# Patient Record
Sex: FEMALE | Race: WHITE | URBAN - METROPOLITAN AREA
[De-identification: names, ages, dates, MRNs, and addresses within clinical notes are randomized per-mention and may not be internally consistent; named-entity substitution may affect disease eponyms.]

---

## 2021-06-22 NOTE — PROGRESS NOTES
Chief Complaint   Patient presents with   [facility].     would like to have labs drawn    Bite     unsure what they are coming from    GI Problem    Weight Management     would like to gain    Rash     showed up 1 week ago, no itchy not spreading.  Lymphadenopathy    Mood Swings     could be anxity         HPI:  Sofía Vallejo is a 23 y.o. (: 2000) here today to establish care and discuss GI issues for the last year in the setting of anxiety. Symptoms include:  - generalized abdominal pain that lasts for minutes to hours. - nausea  - non bloody diarrhea, about 4 times a day  - failure to thrive even though she eats 3 meals and snacks. - sometimes pain happens in the middle of the night. - she cannot find an association with food regarding her symptoms. - no assoc with pre or post prandial, no association with dairy or gluten. - has not tried a full on elimination diet however. - politely requesting testing for h.pylori    Rash on stomach has popped up over the last 3 days. Flesh colored and slightly raised plaques. She is also getting bug bites on legs. Regarding her history of anxiety, she has been treated with zoloft before but stopped it because it made her feel worse. She would love to try [facility] and talk to some one. Her anxiety is worsening over the past couple years but no trigger. Just an underlying anxious feeling that affects her day to day and sleep. Review of Systems   Constitutional: Negative for activity change, appetite change, chills, fatigue, fever and unexpected weight change. HENT: Negative for congestion, postnasal drip, rhinorrhea, sinus pressure, sinus pain, sneezing and sore throat. Eyes: Negative for visual disturbance. Respiratory: Negative for cough, chest tightness, shortness of breath and wheezing. Cardiovascular: Negative for chest pain and palpitations. Gastrointestinal: Positive for abdominal pain, diarrhea and nausea.  Negative for blood in stool, constipation and vomiting. Endocrine: Negative for cold intolerance, heat intolerance, polydipsia and polyuria. Genitourinary: Negative for dysuria, frequency, vaginal bleeding and vaginal discharge. Musculoskeletal: Negative for arthralgias, back pain, joint swelling, myalgias and neck pain. Skin: Positive for rash. Negative for wound. Allergic/Immunologic: Negative for environmental allergies. Neurological: Positive for headaches. Negative for dizziness, tremors, syncope, weakness, light-headedness and numbness. Hematological: Negative for adenopathy. Psychiatric/Behavioral: Positive for behavioral problems and sleep disturbance. Negative for decreased concentration and suicidal ideas. The patient is nervous/anxious. Past Medical History:   Diagnosis Date    Acne     Face       Family History   Problem Relation Age of Onset    Anemia Mother     Heart Defect Father        Social History     Tobacco Use    Smoking status: Former Smoker     Types: Cigarettes    Smokeless tobacco: Never Used   Vaping Use    Vaping Use: Every day    Substances: Nicotine   Substance Use Topics    Alcohol use: Yes     Comment: occ    Drug use: No       New Prescriptions    ESCITALOPRAM (LEXAPRO) 10 MG TABLET    Take 1 tablet by mouth daily       Meds Prior to visit:  No current outpatient medications on file prior to visit. No current facility-administered medications on file prior to visit. Allergies   Allergen Reactions    Doxycycline      nausea    Macrolides And Ketolides Nausea Only    Troleandomycin Nausea Only    Zithromax [Azithromycin]        OBJECTIVE:  /69   Pulse 86   Temp 98.1 °F (36.7 °C) (Temporal)   Resp 16   Ht 5' 4.5\" (1.638 m)   Wt 92 lb (41.7 kg)   LMP 06/20/2021   BMI 15.55 kg/m²   BP Readings from Last 2 Encounters:   06/23/21 104/69     Wt Readings from Last 3 Encounters:   06/23/21 92 lb (41.7 kg)       Physical Exam  Vitals reviewed. Constitutional:       General: She is not in acute distress. Appearance: Normal appearance. She is well-developed. She is not ill-appearing. HENT:      Head: Normocephalic and atraumatic. Right Ear: Tympanic membrane, ear canal and external ear normal. There is no impacted cerumen. Left Ear: Tympanic membrane, ear canal and external ear normal. There is no impacted cerumen. Nose: Nose normal.      Mouth/Throat:      Mouth: Mucous membranes are moist.      Pharynx: Oropharynx is clear. No oropharyngeal exudate or posterior oropharyngeal erythema. Eyes:      General: No scleral icterus. Right eye: No discharge. Left eye: No discharge. Extraocular Movements: Extraocular movements intact. Conjunctiva/sclera: Conjunctivae normal.      Pupils: Pupils are equal, round, and reactive to light. Neck:      Vascular: No carotid bruit. Cardiovascular:      Rate and Rhythm: Normal rate and regular rhythm. Pulses: Normal pulses. Heart sounds: Normal heart sounds. No murmur heard. Comments: Radial and pedal pulses intact  Pulmonary:      Effort: Pulmonary effort is normal. No respiratory distress. Breath sounds: Normal breath sounds. No wheezing or rales. Chest:      Chest wall: No tenderness. Abdominal:      General: Abdomen is flat. Bowel sounds are normal. There is no distension. Palpations: Abdomen is soft. There is no mass. Tenderness: There is no abdominal tenderness. There is no right CVA tenderness, left CVA tenderness, guarding or rebound. Hernia: No hernia is present. Comments: Normal liver and spleen. No organomegaly   Musculoskeletal:         General: No swelling, tenderness, deformity or signs of injury. Normal range of motion. Cervical back: Normal range of motion and neck supple. No tenderness. Right lower leg: No edema. Left lower leg: No edema.       Comments: Intact in all extremities Lymphadenopathy:      Cervical: No cervical adenopathy. Skin:     General: Skin is warm. Capillary Refill: Capillary refill takes less than 2 seconds. Findings: Rash present. No bruising, erythema or lesion. Comments: Stomach: skin coloered, sort of hyperpigmented areas with raised edges. May be plaques but are small. No secondary signs. Neurological:      General: No focal deficit present. Mental Status: She is alert and oriented to person, place, and time. Mental status is at baseline. Cranial Nerves: No cranial nerve deficit. Sensory: No sensory deficit. Motor: No weakness or abnormal muscle tone. Coordination: Coordination normal.      Gait: Gait normal.      Deep Tendon Reflexes: Reflexes normal.   Psychiatric:         Mood and Affect: Mood normal.         Behavior: Behavior normal.         Thought Content: Thought content normal.         Judgment: Judgment normal.         Lab Results   Component Value Date    WBC 13.3 (H) 07/30/2015    HGB 13.8 07/30/2015    HCT 40.5 07/30/2015    MCV 92.2 07/30/2015     07/30/2015     Lab Results   Component Value Date     07/30/2015    K 3.5 07/30/2015    CL 99 07/30/2015    CO2 26 (H) 07/30/2015    BUN 7 07/30/2015    CREATININE <0.5 07/30/2015    GLUCOSE 86 07/30/2015    CALCIUM 8.9 07/30/2015    PROT 7.5 07/30/2015    LABALBU 4.7 07/30/2015    BILITOT 0.5 07/30/2015    ALKPHOS 64 07/30/2015    AST 17 07/30/2015    ALT 13 07/30/2015    LABGLOM >60 07/30/2015    GFRAA >60 07/30/2015    AGRATIO 1.7 07/30/2015    GLOB 2.8 07/30/2015       ASSESSMENT/PLAN:  1. Encounter to establish care  VS reviewed and WNL    BMI reviewed   All questions answered. F/u discussed. Healthy lifestyle modifications discussed.     2. Positive depression screening  On the basis of positive PHQ-9 screening (PHQ-9 Total Score: 18), the following plan was implemented: referral to Behavioral health provided and medication prescribed - patient will call for any significant medication side effects or worsening symptoms of depression. Patient will follow-up in 4 week(s) with PCP.  - Positive Screen for Clinical Depression with a Documented Follow-up Plan   - escitalopram (LEXAPRO) 10 MG tablet; Take 1 tablet by mouth daily  Dispense: 30 tablet; Refill: 3    3. Immunization due  Left before getting shot  - HPV Vaccine 9-valent IM    4. Diarrhea, unspecified type  Update labs  Differential dx: chronic gastritis vs IBS vs IBD vs pancreatic insuff vs GERD vs gallbladder DO vs SLE (given rash) vs infective process  - CBC Auto Differential; Future  - Comprehensive Metabolic Panel; Future  - Celiac Screen with Reflex; Future  - C-REACTIVE PROTEIN; Future  - SEDIMENTATION RATE; Future  - H. Pylori Antigen, Stool; Future  - TSH with Reflex; Future  - ANTI-DNA ANTIBODY, DOUBLE-STRANDED; Future  - WAN Reflex to Antibody [place]; Future  - GIARDIA ANTIGEN; Future  - OVA & PARASITE ID/COUNT #1; Future  - CALPROTECTIN STOOL; Future  - Stool for WBC #1; Future    5. Generalized abdominal pain  As above    6. Adult failure to thrive  As above  May be an inflammatory process not allowing her absorb       Discussed use, benefit, and side effects of prescribed medications. Barriers to medication compliance addressed. All patient questions answered. Pt voiced understanding. RTC Return for 4-6 weeks.     Future Appointments   Date Time Provider Awa Kelly   7/28/2021  1:00 PM Ander Bayfield [facility] [place] [facility]   8/4/2021  1:00 PM Lexy Powell MD [facility] DANICA [place] MD  6/23/2021  12:40 PM

## 2021-06-23 ENCOUNTER — OFFICE VISIT (OUTPATIENT)
Dept: PRIMARY CARE CLINIC | Age: 21
End: 2021-06-23
Payer: COMMERCIAL

## 2021-06-23 VITALS
HEIGHT: 65 IN | TEMPERATURE: 98.1 F | HEART RATE: 86 BPM | SYSTOLIC BLOOD PRESSURE: 104 MMHG | DIASTOLIC BLOOD PRESSURE: 69 MMHG | BODY MASS INDEX: 15.33 KG/M2 | RESPIRATION RATE: 16 BRPM | WEIGHT: 92 LBS

## 2021-06-23 DIAGNOSIS — R62.7 ADULT FAILURE TO THRIVE: ICD-10-CM

## 2021-06-23 DIAGNOSIS — Z76.89 ENCOUNTER TO ESTABLISH CARE: Primary | ICD-10-CM

## 2021-06-23 DIAGNOSIS — R10.84 GENERALIZED ABDOMINAL PAIN: ICD-10-CM

## 2021-06-23 DIAGNOSIS — Z13.31 POSITIVE DEPRESSION SCREENING: ICD-10-CM

## 2021-06-23 DIAGNOSIS — R19.7 DIARRHEA, UNSPECIFIED TYPE: ICD-10-CM

## 2021-06-23 DIAGNOSIS — Z23 IMMUNIZATION DUE: ICD-10-CM

## 2021-06-23 PROBLEM — E55.9 VITAMIN D DEFICIENCY: Status: ACTIVE | Noted: 2017-04-08

## 2021-06-23 PROBLEM — Z30.41 ORAL CONTRACEPTIVE PILL SURVEILLANCE: Status: ACTIVE | Noted: 2017-05-18

## 2021-06-23 PROBLEM — G43.119 INTRACTABLE MIGRAINE WITH VISUAL AURA AND WITHOUT STATUS MIGRAINOSUS: Status: ACTIVE | Noted: 2017-04-06

## 2021-06-23 LAB
A/G RATIO: 1.9 (ref 1.1–2.2)
ALBUMIN SERPL-MCNC: 4.8 G/DL (ref 3.4–5)
ALP BLD-CCNC: 46 U/L (ref 40–129)
ALT SERPL-CCNC: 11 U/L (ref 10–40)
ANION GAP SERPL CALCULATED.3IONS-SCNC: 15 MMOL/L (ref 3–16)
AST SERPL-CCNC: 16 U/L (ref 15–37)
BILIRUB SERPL-MCNC: 0.3 MG/DL (ref 0–1)
BUN BLDV-MCNC: 9 MG/DL (ref 7–20)
C-REACTIVE PROTEIN: <3 MG/L (ref 0–5.1)
CALCIUM SERPL-MCNC: 9.4 MG/DL (ref 8.3–10.6)
CHLORIDE BLD-SCNC: 103 MMOL/L (ref 99–110)
CO2: 24 MMOL/L (ref 21–32)
CREAT SERPL-MCNC: 0.5 MG/DL (ref 0.6–1.1)
GFR AFRICAN AMERICAN: >60
GFR NON-AFRICAN AMERICAN: >60
GLOBULIN: 2.5 G/DL
GLUCOSE BLD-MCNC: 105 MG/DL (ref 70–99)
IGA: 262 MG/DL (ref 70–400)
POTASSIUM SERPL-SCNC: 3.9 MMOL/L (ref 3.5–5.1)
SODIUM BLD-SCNC: 142 MMOL/L (ref 136–145)
TOTAL PROTEIN: 7.3 G/DL (ref 6.4–8.2)
TSH REFLEX: 1.6 UIU/ML (ref 0.27–4.2)

## 2021-06-23 PROCEDURE — G8431 POS CLIN DEPRES SCRN F/U DOC: HCPCS | Performed by: FAMILY MEDICINE

## 2021-06-23 PROCEDURE — 99204 OFFICE O/P NEW MOD 45 MIN: CPT | Performed by: FAMILY MEDICINE

## 2021-06-23 RX ORDER — ESCITALOPRAM OXALATE 10 MG/1
10 TABLET ORAL DAILY
Qty: 30 TABLET | Refills: 3 | Status: SHIPPED | OUTPATIENT
Start: 2021-06-23 | End: 2021-09-15 | Stop reason: SDUPTHER

## 2021-06-23 SDOH — ECONOMIC STABILITY: FOOD INSECURITY: WITHIN THE PAST 12 MONTHS, THE FOOD YOU BOUGHT JUST DIDN'T LAST AND YOU DIDN'T HAVE MONEY TO GET MORE.: PATIENT DECLINED

## 2021-06-23 SDOH — ECONOMIC STABILITY: FOOD INSECURITY: WITHIN THE PAST 12 MONTHS, YOU WORRIED THAT YOUR FOOD WOULD RUN OUT BEFORE YOU GOT MONEY TO BUY MORE.: PATIENT DECLINED

## 2021-06-23 ASSESSMENT — PATIENT HEALTH QUESTIONNAIRE - PHQ9
9. THOUGHTS THAT YOU WOULD BE BETTER OFF DEAD, OR OF HURTING YOURSELF: 0
SUM OF ALL RESPONSES TO PHQ QUESTIONS 1-9: 18
SUM OF ALL RESPONSES TO PHQ9 QUESTIONS 1 & 2: 5
8. MOVING OR SPEAKING SO SLOWLY THAT OTHER PEOPLE COULD HAVE NOTICED. OR THE OPPOSITE, BEING SO FIGETY OR RESTLESS THAT YOU HAVE BEEN MOVING AROUND A LOT MORE THAN USUAL: 3
1. LITTLE INTEREST OR PLEASURE IN DOING THINGS: 3
SUM OF ALL RESPONSES TO PHQ QUESTIONS 1-9: 18
5. POOR APPETITE OR OVEREATING: 0
4. FEELING TIRED OR HAVING LITTLE ENERGY: 3
2. FEELING DOWN, DEPRESSED OR HOPELESS: 2
6. FEELING BAD ABOUT YOURSELF - OR THAT YOU ARE A FAILURE OR HAVE LET YOURSELF OR YOUR FAMILY DOWN: 1
SUM OF ALL RESPONSES TO PHQ QUESTIONS 1-9: 18
7. TROUBLE CONCENTRATING ON THINGS, SUCH AS READING THE NEWSPAPER OR WATCHING TELEVISION: 3
10. IF YOU CHECKED OFF ANY PROBLEMS, HOW DIFFICULT HAVE THESE PROBLEMS MADE IT FOR YOU TO DO YOUR WORK, TAKE CARE OF THINGS AT HOME, OR GET ALONG WITH OTHER PEOPLE: 2
3. TROUBLE FALLING OR STAYING ASLEEP: 3

## 2021-06-23 ASSESSMENT — ANXIETY QUESTIONNAIRES
1. FEELING NERVOUS, ANXIOUS, OR ON EDGE: 3-NEARLY EVERY DAY
GAD7 TOTAL SCORE: 19
3. WORRYING TOO MUCH ABOUT DIFFERENT THINGS: 3-NEARLY EVERY DAY
4. TROUBLE RELAXING: 1-SEVERAL DAYS
7. FEELING AFRAID AS IF SOMETHING AWFUL MIGHT HAPPEN: 3-NEARLY EVERY DAY
5. BEING SO RESTLESS THAT IT IS HARD TO SIT STILL: 3-NEARLY EVERY DAY
2. NOT BEING ABLE TO STOP OR CONTROL WORRYING: 3-NEARLY EVERY DAY
6. BECOMING EASILY ANNOYED OR IRRITABLE: 3-NEARLY EVERY DAY

## 2021-06-23 ASSESSMENT — ENCOUNTER SYMPTOMS
SINUS PRESSURE: 0
BLOOD IN STOOL: 0
SORE THROAT: 0
SINUS PAIN: 0
CHEST TIGHTNESS: 0
ABDOMINAL PAIN: 1
WHEEZING: 0
COUGH: 0
BACK PAIN: 0
DIARRHEA: 1
VOMITING: 0
NAUSEA: 1
CONSTIPATION: 0
RHINORRHEA: 0
SHORTNESS OF BREATH: 0

## 2021-06-23 ASSESSMENT — COLUMBIA-SUICIDE SEVERITY RATING SCALE - C-SSRS
1. WITHIN THE PAST MONTH, HAVE YOU WISHED YOU WERE DEAD OR WISHED YOU COULD GO TO SLEEP AND NOT WAKE UP?: NO
2. HAVE YOU ACTUALLY HAD ANY THOUGHTS OF KILLING YOURSELF?: NO
6. HAVE YOU EVER DONE ANYTHING, STARTED TO DO ANYTHING, OR PREPARED TO DO ANYTHING TO END YOUR LIFE?: NO

## 2021-06-23 ASSESSMENT — SOCIAL DETERMINANTS OF HEALTH (SDOH): HOW HARD IS IT FOR YOU TO PAY FOR THE VERY BASICS LIKE FOOD, HOUSING, MEDICAL CARE, AND HEATING?: NOT HARD AT ALL

## 2021-06-24 DIAGNOSIS — R19.7 DIARRHEA, UNSPECIFIED TYPE: ICD-10-CM

## 2021-06-24 LAB
ANTI-DSDNA IGG: 2 IU/ML (ref 0–9)
ANTI-NUCLEAR ANTIBODY (ANA): NEGATIVE
BASOPHILS ABSOLUTE: 0.1 K/UL (ref 0–0.2)
BASOPHILS RELATIVE PERCENT: 1.2 %
EOSINOPHILS ABSOLUTE: 0.1 K/UL (ref 0–0.6)
EOSINOPHILS RELATIVE PERCENT: 2.2 %
HCT VFR BLD CALC: 40.3 % (ref 36–48)
HEMOGLOBIN: 13.7 G/DL (ref 12–16)
LYMPHOCYTES ABSOLUTE: 1.8 K/UL (ref 1–5.1)
LYMPHOCYTES RELATIVE PERCENT: 32.5 %
MCH RBC QN AUTO: 32.9 PG (ref 26–34)
MCHC RBC AUTO-ENTMCNC: 34.1 G/DL (ref 31–36)
MCV RBC AUTO: 96.4 FL (ref 80–100)
MONOCYTES ABSOLUTE: 0.3 K/UL (ref 0–1.3)
MONOCYTES RELATIVE PERCENT: 6.4 %
NEUTROPHILS ABSOLUTE: 3.1 K/UL (ref 1.7–7.7)
NEUTROPHILS RELATIVE PERCENT: 57.7 %
PDW BLD-RTO: 11.9 % (ref 12.4–15.4)
PLATELET # BLD: 251 K/UL (ref 135–450)
PMV BLD AUTO: 8.3 FL (ref 5–10.5)
RBC # BLD: 4.18 M/UL (ref 4–5.2)
SEDIMENTATION RATE, ERYTHROCYTE: 3 MM/HR (ref 0–20)
TISSUE TRANSGLUTAMINASE IGA: <0.5 U/ML (ref 0–14)
WBC # BLD: 5.4 K/UL (ref 4–11)

## 2021-06-25 ENCOUNTER — TELEPHONE (OUTPATIENT)
Dept: PRIMARY CARE CLINIC | Age: 21
End: 2021-06-25

## 2021-06-25 NOTE — TELEPHONE ENCOUNTER
----- Message from Tere Delaney MD sent at 6/25/2021  8:01 AM EDT -----  Please call patient with results:    -Celiac screen for gluten sensitivity was negative  -Thyroid function was completely normal  -No sign of autoimmune or inflammatory bowel disease  -Metabolic panel shows normal electrolytes kidney function and liver function    Thank you!   Dr. Michelle Ng

## 2021-06-28 LAB
CALPROTECTIN, FECAL: <5 UG/G
H PYLORI ANTIGEN STOOL: NEGATIVE

## 2021-06-30 LAB — INTERPRETATION: NEGATIVE

## 2021-09-15 ENCOUNTER — TELEPHONE (OUTPATIENT)
Dept: PRIMARY CARE CLINIC | Age: 21
End: 2021-09-15

## 2021-09-15 DIAGNOSIS — Z13.31 POSITIVE DEPRESSION SCREENING: ICD-10-CM

## 2021-09-15 RX ORDER — ESCITALOPRAM OXALATE 10 MG/1
10 TABLET ORAL DAILY
Qty: 90 TABLET | Refills: 2 | Status: SHIPPED | OUTPATIENT
Start: 2021-09-15 | End: 2022-05-12

## 2021-09-15 NOTE — TELEPHONE ENCOUNTER
----- Message from Leia Vieyra sent at 9/14/2021  5:59 PM EDT -----  Subject: Refill Request    QUESTIONS  Name of Medication? escitalopram (LEXAPRO) 10 MG tablet  Patient-reported dosage and instructions? 15 mg, once a day  How many days do you have left? 0  Preferred Pharmacy? [id number]  Pharmacy phone number (if available)? [phone]  ---------------------------------------------------------------------------  --------------  CALL BACK INFO  What is the best way for the office to contact you? OK to leave message on   voicemail  Preferred Call Back Phone Number?  [phone]

## 2021-09-15 NOTE — TELEPHONE ENCOUNTER
Medication: escitalopram (LEXAPRO) 10 MG tablet          Last Filled:  06/23/21    Patient Phone Number: [phone] (home)     Last appt: 6/23/2021 RTC Return for 4-6 weeks. Next appt: Visit date not found    Last OARRS: No flowsheet data found.

## 2021-10-22 ENCOUNTER — PATIENT MESSAGE (OUTPATIENT)
Dept: PRIMARY CARE CLINIC | Age: 21
End: 2021-10-22

## 2021-10-24 NOTE — PROGRESS NOTES
Chief Complaint   Patient presents with    Follow-up     rash         ASSESSMENT/PLAN:  1. Rash and nonspecific skin eruption  Differential includes fungal versus dermatitis   Referral placed  Before starting antifungal or steroid, patient would like to see dermatology  - Pratik Peterson MD, Dermatology, [place]    2. Diarrhea, unspecified type  Differential includes IBS versus gastritis versus anxiety  No relation to food  Nonbloody  Only happens at work when she feels flushed plus or minus nausea and vomiting. Continue to monitor    3. Non-intractable vomiting with nausea, unspecified vomiting type  Differential includes anxiety versus GERD  No relation to food, nonbloody  Only happens at work when she feels flushed plus or minus diarrhea  Discussed referring to gastroenterology but she would like to wait and see if treating her anxiety helps  - ondansetron (ZOFRAN ODT) 4 MG disintegrating tablet; Take 1 tablet by mouth every 8 hours as needed for Nausea or Vomiting  Dispense: 20 tablet; Refill: 0    4. MEERA (generalized anxiety disorder)  Increase Lexapro to 15 mg daily,10 mg and 5 mg tab daily  Follow-up in 4 weeks  For behavioral health the politely declined  - escitalopram (LEXAPRO) 5 MG tablet; Take 1 tablet by mouth daily  Dispense: 90 tablet; Refill: 1    5. Need for influenza vaccination  Administered  - INFLUENZA, MDCK QUADV, 2 YRS AND OLDER, IM, PF, PREFILL SYR OR SDV, 0.5ML (FLUCELVAX QUADV, PF)    6. Need for HPV vaccination  Administered  - HPV Vaccine 9-valent IM         HPI:  Fausto Serra is a 19 y.o. (: 2000) here today for multiple concerns:    - Rash: started about 2 months ago and has spread across back and down her arms. Not itchy. Has tried selsum blue without improvement and something OTC topical for tinea that didn't work. No fevers or chills or systemic symptoms.    - Anxiety and depression: on Lexapro and it is going well.   Unsure if the nausea and vomiting is triggered by anxiety but she can relate these symptoms when she is at work. - Nausea, vomiting and diarrhea: Started in June. Has been taking zofran and this helps. No association with food or drink or anxiety triggers. 3 times a week, she will be at her baseline and then all the sudden get flushed and have diarrhea or N/V or both. Feels fine right after. Is able to relate these moments to her working environment. Not only happens when she is at work. Would like to update her flu and HPV shots today. Review of Systems   Constitutional: Negative for activity change, appetite change, chills, fatigue, fever and unexpected weight change. HENT: Negative for congestion, postnasal drip, rhinorrhea, sinus pressure, sinus pain, sneezing and sore throat. Eyes: Negative for visual disturbance. Respiratory: Negative for cough, chest tightness, shortness of breath and wheezing. Cardiovascular: Negative for chest pain and palpitations. Gastrointestinal: Positive for diarrhea, nausea and vomiting. Negative for abdominal pain, blood in stool and constipation. Endocrine: Negative for cold intolerance, heat intolerance, polydipsia and polyuria. Genitourinary: Negative for dysuria, frequency, vaginal bleeding and vaginal discharge. Musculoskeletal: Negative for arthralgias, back pain, joint swelling, myalgias and neck pain. Skin: Positive for rash. Negative for wound. Allergic/Immunologic: Negative for environmental allergies. Neurological: Negative for dizziness, tremors, syncope, weakness, light-headedness, numbness and headaches. Hematological: Negative for adenopathy. Psychiatric/Behavioral: Negative for behavioral problems, decreased concentration, sleep disturbance and suicidal ideas. The patient is not nervous/anxious.         Past Medical History:   Diagnosis Date    Acne     Face       Family History   Problem Relation Age of Onset    Anemia Mother     Heart Defect Father Social History     Tobacco Use    Smoking status: Former Smoker     Types: Cigarettes    Smokeless tobacco: Never Used   Vaping Use    Vaping Use: Every day    Substances: Nicotine   Substance Use Topics    Alcohol use: Yes     Comment: occ    Drug use: No       New Prescriptions    ESCITALOPRAM (LEXAPRO) 5 MG TABLET    Take 1 tablet by mouth daily    ONDANSETRON (ZOFRAN ODT) 4 MG DISINTEGRATING TABLET    Take 1 tablet by mouth every 8 hours as needed for Nausea or Vomiting       Meds Prior to visit:  Current Outpatient Medications on File Prior to Visit   Medication Sig Dispense Refill    escitalopram (LEXAPRO) 10 MG tablet Take 1 tablet by mouth daily 90 tablet 2     No current facility-administered medications on file prior to visit. Allergies   Allergen Reactions    Doxycycline      nausea    Macrolides And Ketolides Nausea Only    Troleandomycin Nausea Only    Zithromax [Azithromycin]        OBJECTIVE:  /70   Pulse 105   Temp 98 °F (36.7 °C)   Ht 5' 5\" (1.651 m)   Wt 97 lb 12.8 oz (44.4 kg)   LMP 09/23/2021   SpO2 98%   BMI 16.27 kg/m²   BP Readings from Last 2 Encounters:   10/25/21 107/70   06/23/21 104/69     Wt Readings from Last 3 Encounters:   10/25/21 97 lb 12.8 oz (44.4 kg)   06/23/21 92 lb (41.7 kg)       Physical Exam  Vitals reviewed. Constitutional:       General: She is not in acute distress. Appearance: Normal appearance. She is well-developed and normal weight. She is not ill-appearing. HENT:      Head: Normocephalic and atraumatic. Right Ear: Tympanic membrane, ear canal and external ear normal. There is no impacted cerumen. Left Ear: Tympanic membrane, ear canal and external ear normal. There is no impacted cerumen. Nose: Nose normal. No congestion. Mouth/Throat:      Mouth: Mucous membranes are moist.      Pharynx: Oropharynx is clear. No oropharyngeal exudate or posterior oropharyngeal erythema.    Eyes:      General: No scleral icterus. Right eye: No discharge. Left eye: No discharge. Extraocular Movements: Extraocular movements intact. Conjunctiva/sclera: Conjunctivae normal.      Pupils: Pupils are equal, round, and reactive to light. Cardiovascular:      Rate and Rhythm: Normal rate and regular rhythm. Pulses: Normal pulses. Heart sounds: Normal heart sounds. No murmur heard. Comments: Radial and pedal pulses intact  Pulmonary:      Effort: Pulmonary effort is normal. No respiratory distress. Breath sounds: Normal breath sounds. No stridor. No wheezing, rhonchi or rales. Chest:      Chest wall: No tenderness. Abdominal:      General: Bowel sounds are normal.      Palpations: Abdomen is soft. Tenderness: There is no abdominal tenderness. There is no guarding. Comments: Normal liver and spleen. No organomegaly   Musculoskeletal:         General: No tenderness. Normal range of motion. Cervical back: Normal range of motion and neck supple. Right lower leg: No edema. Left lower leg: No edema. Comments: Intact in all extremities   Lymphadenopathy:      Cervical: No cervical adenopathy. Skin:     General: Skin is warm. Capillary Refill: Capillary refill takes less than 2 seconds. Findings: Erythema and rash present. Comments: Maculopapular, skin colored rash on mid to lower back. Upper back looks more like the slightly raised, small erythematous plaques, some converging into larger ones. Neurological:      General: No focal deficit present. Mental Status: She is alert and oriented to person, place, and time. Mental status is at baseline. Motor: No weakness or abnormal muscle tone. Coordination: Coordination normal.      Gait: Gait normal.      Deep Tendon Reflexes: Reflexes normal.   Psychiatric:         Mood and Affect: Mood normal.         Behavior: Behavior normal.         Thought Content:  Thought content normal. Judgment: Judgment normal.             Lab Results   Component Value Date    WBC 5.4 06/23/2021    HGB 13.7 06/23/2021    HCT 40.3 06/23/2021    MCV 96.4 06/23/2021     06/23/2021     Lab Results   Component Value Date     06/23/2021    K 3.9 06/23/2021     06/23/2021    CO2 24 06/23/2021    BUN 9 06/23/2021    CREATININE 0.5 (L) 06/23/2021    GLUCOSE 105 (H) 06/23/2021    CALCIUM 9.4 06/23/2021    PROT 7.3 06/23/2021    LABALBU 4.8 06/23/2021    BILITOT 0.3 06/23/2021    ALKPHOS 46 06/23/2021    AST 16 06/23/2021    ALT 11 06/23/2021    LABGLOM >60 06/23/2021    GFRAA >60 06/23/2021    AGRATIO 1.9 06/23/2021    GLOB 2.5 06/23/2021         Discussed use, benefit, and side effects of prescribed medications. Barriers to medication compliance addressed. All patient questions answered. Pt voiced understanding. RTC Return in about 4 weeks (around 11/22/2021).     Future Appointments   Date Time Provider Awa Kelly   11/22/2021 11:00 AM Charmayne Sheer, MD Pamala Hartshorn, MD  10/25/2021  12:39 PM

## 2021-10-25 ENCOUNTER — TELEPHONE (OUTPATIENT)
Dept: DERMATOLOGY | Age: 21
End: 2021-10-25

## 2021-10-25 ENCOUNTER — OFFICE VISIT (OUTPATIENT)
Dept: PRIMARY CARE CLINIC | Age: 21
End: 2021-10-25
Payer: COMMERCIAL

## 2021-10-25 VITALS
TEMPERATURE: 98 F | DIASTOLIC BLOOD PRESSURE: 70 MMHG | OXYGEN SATURATION: 98 % | BODY MASS INDEX: 16.29 KG/M2 | HEIGHT: 65 IN | WEIGHT: 97.8 LBS | SYSTOLIC BLOOD PRESSURE: 107 MMHG | HEART RATE: 105 BPM

## 2021-10-25 DIAGNOSIS — Z23 NEED FOR HPV VACCINATION: ICD-10-CM

## 2021-10-25 DIAGNOSIS — F41.1 GAD (GENERALIZED ANXIETY DISORDER): ICD-10-CM

## 2021-10-25 DIAGNOSIS — Z23 NEED FOR INFLUENZA VACCINATION: ICD-10-CM

## 2021-10-25 DIAGNOSIS — R11.2 NON-INTRACTABLE VOMITING WITH NAUSEA, UNSPECIFIED VOMITING TYPE: ICD-10-CM

## 2021-10-25 DIAGNOSIS — R21 RASH AND NONSPECIFIC SKIN ERUPTION: Primary | ICD-10-CM

## 2021-10-25 DIAGNOSIS — R19.7 DIARRHEA, UNSPECIFIED TYPE: ICD-10-CM

## 2021-10-25 PROCEDURE — 99395 PREV VISIT EST AGE 18-39: CPT | Performed by: FAMILY MEDICINE

## 2021-10-25 PROCEDURE — 90471 IMMUNIZATION ADMIN: CPT | Performed by: FAMILY MEDICINE

## 2021-10-25 PROCEDURE — 90651 9VHPV VACCINE 2/3 DOSE IM: CPT | Performed by: FAMILY MEDICINE

## 2021-10-25 PROCEDURE — 99214 OFFICE O/P EST MOD 30 MIN: CPT | Performed by: FAMILY MEDICINE

## 2021-10-25 PROCEDURE — 90674 CCIIV4 VAC NO PRSV 0.5 ML IM: CPT | Performed by: FAMILY MEDICINE

## 2021-10-25 PROCEDURE — 90472 IMMUNIZATION ADMIN EACH ADD: CPT | Performed by: FAMILY MEDICINE

## 2021-10-25 RX ORDER — ESCITALOPRAM OXALATE 5 MG/1
5 TABLET ORAL DAILY
Qty: 90 TABLET | Refills: 1 | Status: SHIPPED | OUTPATIENT
Start: 2021-10-25 | End: 2022-04-22 | Stop reason: SDUPTHER

## 2021-10-25 RX ORDER — ONDANSETRON 4 MG/1
4 TABLET, ORALLY DISINTEGRATING ORAL EVERY 8 HOURS PRN
Qty: 20 TABLET | Refills: 0 | Status: SHIPPED | OUTPATIENT
Start: 2021-10-25 | End: 2021-11-30 | Stop reason: SDUPTHER

## 2021-10-25 ASSESSMENT — ENCOUNTER SYMPTOMS
BACK PAIN: 0
DIARRHEA: 1
NAUSEA: 1
CHEST TIGHTNESS: 0
CONSTIPATION: 0
COUGH: 0
SINUS PRESSURE: 0
SORE THROAT: 0
VOMITING: 1
BLOOD IN STOOL: 0
ABDOMINAL PAIN: 0
SHORTNESS OF BREATH: 0
WHEEZING: 0
SINUS PAIN: 0
RHINORRHEA: 0

## 2021-10-25 NOTE — TELEPHONE ENCOUNTER
----- Message from Jagdish Arriola MD sent at 10/25/2021  3:35 PM EDT -----  Regarding: Please schedule 11/1 at 3:00 pm    ----- Message -----  From: Navin Still MD  Sent: 10/25/2021  11:32 AM EDT  To: Jagdish Arriola MD    Pic in media. 2+ months of blotchy, small erythematous plaques on back, shoulders and lower abd. Seems to be spreading. Not itchy.

## 2021-10-25 NOTE — Clinical Note
Pic in media. 2+ months of blotchy, small erythematous plaques on back, shoulders and lower abd. Seems to be spreading. Not itchy.

## 2021-10-25 NOTE — TELEPHONE ENCOUNTER
From: [facility]  To: Cary Mead MD  Sent: 10/22/2021 12:56 PM EDT  Subject: Prescription Question    Hi Dr Lila Kaur.   I know i have an appointment scheduled for Monday. I repeatedly am getting sick. Severe nausea, throwing up, cramping pains. It has taken me out of work. A friend gave me a Zofran (which Ive taken before) and it worked really well. I was hoping i could get a prescription of those until we can find out why im getting sick?

## 2021-11-01 ENCOUNTER — OFFICE VISIT (OUTPATIENT)
Dept: DERMATOLOGY | Age: 21
End: 2021-11-01
Payer: COMMERCIAL

## 2021-11-01 VITALS — TEMPERATURE: 97.9 F

## 2021-11-01 DIAGNOSIS — B36.0 TINEA VERSICOLOR: Primary | ICD-10-CM

## 2021-11-01 DIAGNOSIS — D22.9 MULTIPLE BENIGN MELANOCYTIC NEVI: ICD-10-CM

## 2021-11-01 PROCEDURE — 99203 OFFICE O/P NEW LOW 30 MIN: CPT | Performed by: DERMATOLOGY

## 2021-11-01 RX ORDER — FLUCONAZOLE 150 MG/1
TABLET ORAL
Qty: 8 TABLET | Refills: 0 | Status: SHIPPED | OUTPATIENT
Start: 2021-11-01 | End: 2021-12-02

## 2021-11-01 NOTE — PROGRESS NOTES
Patient's Name: Tori Neves  MRN: <[id number]>  YOB: 2000  Date of Visit: 11/1/2021  Primary Care Provider: Varghese Vazquez MD  Referring Provider: Laurel Rodriguez MD    Subjective:     Chief Complaint   Patient presents with    New Patient     Rash       History of Present Illness:  Tori Neves an 19 y.o. female who presents in clinic today as a new patient seen in consultation request by Varghese Vazquez MD for a full body skin examination and evaluation of a rash . NP here for red, bumpy, non itchy spots on abdomen, upper back and shoulders, with white spots on both arms Not itchy or bothersome. Noticed about a adair ago. Tried OTC Selsum Bluebody shampoo and OTC fungal cream with no improvement. She is concerned about the following spot/s that she would like checked:        What is it: rash  Signs and symptoms: Dry, red and scale  Location: all abdomen, back and arms  Severity: Severity now is 5/10. Because of the spread   Modifying factors: Things that make this condition worse are none. Things that make this condition better are none. Duration: This lesion has been present for 2 years.   Current treatment: none  Previous treatment: Selsun blue shampoo, Lotrimin cream  Patient reports the rash was noted in photos from last summer trip at the beach (2020)      Past Dermatologic History:  Personal history of non melanoma skin cancer: No   Personal history of melanoma: No  Personal history of abnormal/dysplastic moles: No  Personal history of tanning bed use current: No  Personal history of tanning bed use: Yes  Personal history of blistering sunburns: No  Personal history of extensive sun exposure: No  Burns easily: No  Practicing sun protective habits:  Yes using sunscreen SPF  30 +     Social History:   Occupation Current or Former: 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 Patient denies  a family history of abnormal moles  Patient denies  an immediate family history of melanoma. Past Medical History:  Past Medical History:   Diagnosis Date    Acne     Face       Past Surgical History:  Past Surgical History:   Procedure Laterality Date    TONSILLECTOMY         Past Family History:  Family History   Problem Relation Age of Onset    Anemia Mother     Heart Defect Father        Allergies: Allergies   Allergen Reactions    Doxycycline      nausea    Macrolides And Ketolides Nausea Only    Troleandomycin Nausea Only    Zithromax [Azithromycin]        Current Medications:  Current Outpatient Medications   Medication Sig Dispense Refill    fluconazole (DIFLUCAN) 150 MG tablet Take two pills PO once weekly x 4 weeks. Take with acidic beverage, exercise 30 min later, rinse off 8 hours later 8 tablet 0    ondansetron (ZOFRAN ODT) 4 MG disintegrating tablet Take 1 tablet by mouth every 8 hours as needed for Nausea or Vomiting 20 tablet 0    escitalopram (LEXAPRO) 5 MG tablet Take 1 tablet by mouth daily 90 tablet 1    escitalopram (LEXAPRO) 10 MG tablet Take 1 tablet by mouth daily 90 tablet 2     No current facility-administered medications for this visit. Review of Systems:  Constitutional: No fevers, chills or recent illness.    Skin: Skin:As per HPI AND otherwise no new, bleeding or symptomatic skin lesions      Objective:     Vitals:    11/01/21 1512   Temp: 97.9 °F (36.6 °C)   TempSrc: Infrared     Physical Examination:  General: alert, comfortable, no apparent distress, well-appearing  Psych: alert, oriented and pleasant  Neuro: oriented to person, place, and time  Skin: Areas examined: head including face, lips, conjunctiva and lids, neck, hair/scalp, chest, including breasts and axilla, abdomen, back, right upper extremity, left upper extremity, left hand, right hand and digits and nails      All areas examined were within normal limits except those listed below with the appropriate assessment and plan    Assessment and Plan (with relevant objective exam findings):     1. Tinea Versicolor  Location: chest, abdomen, axillae, shoulders, back  Objective findings: pink to salmon colored small papules, some reticulated with powdery scale    KOH was done revealing: Numerous hyphae and yeasts in a typical \"ziti and meatball\" pattern    Discussed that this is due to a yeast that grows on human skin, and genes and environment play a role in our susceptibility to it. Hot and humid climates are worse, and it is exacerbated by sweat and exercise. Treatment can be done with topical and oral antifungal agents    After discussing r/b/a for therapy, decision made to start:        Fluconazole 300 mg weekly x 4 weeks. Take with acidic beverage, exercise 30 min later, rinse off 8 hours later     Discussed selsun blue or ketoconazole shampoo once weekly to leave on x 5 min and rinse as maintenance. 2. Multiple benign melanocytic nevi   Location: torso  Objective findings: multiple brown/pink macules and papules without abnormal findings or concerning findings on exam and dermatoscopy    - Benign and need no therapy. Observation for any changes recommended     Patient would like to address her acne and was agreeable to schedule an appointment for follow up and evaluate acne. Follow up:  Return visit in 4 weeks or as needed for change in condition. All questions addressed. Procedure:   Dalila Roblero was done revealing: numerous branching hyphae          I saw your patient Katie Day at the date listed above in my Dermatology  clinic in [facility]. Thank you very much for the referral.    My exam findings, assessment and plan can be found in EPIC, I have also attached them below for your convenience.     I very much appreciate your referral and the opportunity to participate in this patient's care. Please don't hesitate to contact me with questions or concerns about our visit or management of this patient in the future.      Pierce Arriaga MD, MS

## 2021-11-02 NOTE — PATIENT INSTRUCTIONS
What tinea versicolor? Tinea versicolor is a skin infection that causes areas of the skin to change color. The skin might have lighter patches, darker patches, or both light and dark patches. Tinea versicolor is caused by a fungus. This fungus lives on people's skin and does not cause problems normally. But in some people, the fungus can cause tinea versicolor. This happens more often in people who live where the weather is hot and humid. Even though tinea versicolor is caused by fungus, it does not spread from one person to another. It is not \"contagious. \"    What are the symptoms of tinea versicolor? Tinea versicolor often appears as lots of small spots of color that seem to run into each other and form large patches. The colors can vary from white to light brown, dark brown, gray-black or pinkish red. There can also be a mix of colors. Tinea versicolor usually shows up on the back, chest, or upper arms (picture 1A-C). It can also happen on the face or in places where the skin rubs together, such as the armpit. People often notice this problem more in the summer when affected areas of the skin stand out because they don't get tan from the sun. Is there a test for tinea versicolor? Yes. After learning about your symptoms and doing an exam, your doctor or nurse might gently scrape the surface of your skin and look at the scrapings under a microscope. This procedure is usually not painful. If you have tinea versicolor, the doctor or nurse will see the fungus that causes the condition in the scrapings from your skin. How is tinea versicolor treated? Most mild cases of tinea versicolor only need a special \"shampoo\" or cream. The shampoo is used like a soap on the affected skin. If your tinea versicolor covers a large part of your body, or if it doesn't get better with the shampoo or cream, you might need medicine that comes in pills. Your doctor will decide if you need pills.     Even after you get treated, your skin might not go back to its normal color for several months. This does not mean the treatment didn't work. It just takes time for the skin to heal.    Can tinea versicolor be prevented? If the tinea versicolor keeps coming back, there are shampoos or medicines that can help prevent it. Your doctor will work with you on the best treatment plan for your situation.

## 2021-11-10 ENCOUNTER — PATIENT MESSAGE (OUTPATIENT)
Dept: PRIMARY CARE CLINIC | Age: 21
End: 2021-11-10

## 2021-11-10 DIAGNOSIS — R11.2 NON-INTRACTABLE VOMITING WITH NAUSEA, UNSPECIFIED VOMITING TYPE: ICD-10-CM

## 2021-11-10 DIAGNOSIS — R19.7 DIARRHEA, UNSPECIFIED TYPE: Primary | ICD-10-CM

## 2021-11-10 NOTE — TELEPHONE ENCOUNTER
From: [facility]  To: Dr. Joy Sale: 11/10/2021 2:35 PM EST  Subject: Non-Urgent Bhavik Laws Dr Chrissie Rice i was wondering if i could get a GI referral, as my zofran is only working if i can catch it in time. It seems to happen so fast, less than 5 minutes, the nausea and the puking happens. Balwinder left my coworkers short handed because of this and its making my anxiety worse!  Please help :(

## 2021-11-21 NOTE — PROGRESS NOTES
Chief Complaint   Patient presents with    Anxiety    Referral - General     gyn         ASSESSMENT/PLAN:  1. MEERA (generalized anxiety disorder)  Updated MEERA and improved  We will continue Lexapro 15 mg daily  No unwanted side effects  Offered behavioral health the politely declined at this time  Follow-up in 3 months to gauge improvement and continue to monitor  - [place] BEHAV ASSMT W/SCORE & DOCD/STAND INSTRUMENT    2. Cervical cancer screening  Referral placed  - Aliya Souza MD, Gynecology, [facility]    I have spent 30 minutes of face to face time with the patient with more than 50% spent counseling, educating and coordinating care for mental health and referral.         HPI:  Chris Mojica is a 19 y.o. (: 2000) here today for anxiety treatment. MEERA 7 SCORE 2021   MEERA-7 Total Score 13 19   Interpretation of MEERA-7 score: 5-9 = mild anxiety, 10-14 = moderate anxiety, 15+ = severe anxiety. Recommend referral to behavioral health for scores 10 or greater. Currently taking Lexapro 15 mg daily  Would like to continue this dose  Sleeping well  No appetite changes    Would like referral to gynecology for Pap smear      Review of Systems   Constitutional: Negative for appetite change, chills, fatigue and fever. HENT: Negative for congestion. Eyes: Negative for pain and visual disturbance. Respiratory: Negative for cough and shortness of breath. Cardiovascular: Negative for chest pain and palpitations. Gastrointestinal: Negative for abdominal pain, constipation and diarrhea. Genitourinary: Negative for difficulty urinating. Musculoskeletal: Negative for arthralgias. Skin: Negative for rash and wound. Neurological: Negative for dizziness, weakness, light-headedness and headaches. Hematological: Does not bruise/bleed easily. Psychiatric/Behavioral: Negative for behavioral problems.        Past Medical History:   Diagnosis Date    Acne     Face       Family History   Problem Relation Age of Onset    Anemia Mother     Heart Defect Father        Social History     Tobacco Use    Smoking status: Former Smoker     Types: Cigarettes    Smokeless tobacco: Never Used   Vaping Use    Vaping Use: Every day    Substances: Nicotine   Substance Use Topics    Alcohol use: Yes     Comment: occ    Drug use: No       New Prescriptions    No medications on file       Meds Prior to visit:  Current Outpatient Medications on File Prior to Visit   Medication Sig Dispense Refill    fluconazole (DIFLUCAN) 150 MG tablet Take two pills PO once weekly x 4 weeks. Take with acidic beverage, exercise 30 min later, rinse off 8 hours later 8 tablet 0    ondansetron (ZOFRAN ODT) 4 MG disintegrating tablet Take 1 tablet by mouth every 8 hours as needed for Nausea or Vomiting 20 tablet 0    escitalopram (LEXAPRO) 5 MG tablet Take 1 tablet by mouth daily 90 tablet 1    escitalopram (LEXAPRO) 10 MG tablet Take 1 tablet by mouth daily 90 tablet 2     No current facility-administered medications on file prior to visit. Allergies   Allergen Reactions    Doxycycline      nausea    Macrolides And Ketolides Nausea Only    Troleandomycin Nausea Only    Zithromax [Azithromycin]        OBJECTIVE:  /61   Pulse 81   Temp 98 °F (36.7 °C) (Temporal)   Ht 5' 4\" (1.626 m)   Wt 101 lb 12.8 oz (46.2 kg)   LMP 11/01/2021 (Approximate)   BMI 17.47 kg/m²   BP Readings from Last 2 Encounters:   11/22/21 107/61   10/25/21 107/70     Wt Readings from Last 3 Encounters:   11/22/21 101 lb 12.8 oz (46.2 kg)   10/25/21 97 lb 12.8 oz (44.4 kg)   06/23/21 92 lb (41.7 kg)       Physical Exam  Vitals reviewed. Constitutional:       General: She is not in acute distress. Appearance: Normal appearance. She is not ill-appearing. HENT:      Head: Normocephalic and atraumatic. Right Ear: External ear normal.      Left Ear: External ear normal.      Nose: Nose normal. No congestion. Mouth/Throat:      Mouth: Mucous membranes are moist.      Pharynx: Oropharynx is clear. No oropharyngeal exudate. Eyes:      Extraocular Movements: Extraocular movements intact. Conjunctiva/sclera: Conjunctivae normal.      Pupils: Pupils are equal, round, and reactive to light. Cardiovascular:      Rate and Rhythm: Normal rate and regular rhythm. Pulses: Normal pulses. Heart sounds: Normal heart sounds. Pulmonary:      Effort: Pulmonary effort is normal. No respiratory distress. Breath sounds: Normal breath sounds. No stridor. No wheezing, rhonchi or rales. Abdominal:      General: Bowel sounds are normal.      Palpations: Abdomen is soft. Tenderness: There is no abdominal tenderness. Musculoskeletal:         General: Normal range of motion. Cervical back: Normal range of motion and neck supple. Right lower leg: No edema. Left lower leg: No edema. Skin:     General: Skin is warm. Capillary Refill: Capillary refill takes less than 2 seconds. Findings: No erythema or rash. Neurological:      General: No focal deficit present. Mental Status: She is alert and oriented to person, place, and time. Mental status is at baseline. Motor: No weakness. Coordination: Coordination normal.      Gait: Gait normal.   Psychiatric:         Mood and Affect: Mood normal.         Behavior: Behavior normal.         Thought Content:  Thought content normal.         Judgment: Judgment normal.         Lab Results   Component Value Date    WBC 5.4 06/23/2021    HGB 13.7 06/23/2021    HCT 40.3 06/23/2021    MCV 96.4 06/23/2021     06/23/2021     Lab Results   Component Value Date     06/23/2021    K 3.9 06/23/2021     06/23/2021    CO2 24 06/23/2021    BUN 9 06/23/2021    CREATININE 0.5 (L) 06/23/2021    GLUCOSE 105 (H) 06/23/2021    CALCIUM 9.4 06/23/2021    PROT 7.3 06/23/2021    LABALBU 4.8 06/23/2021    BILITOT 0.3 06/23/2021    ALKPHOS 46 06/23/2021    AST 16 06/23/2021    ALT 11 06/23/2021    LABGLOM >60 06/23/2021    GFRAA >60 06/23/2021    AGRATIO 1.9 06/23/2021    GLOB 2.5 06/23/2021       Discussed use, benefit, and side effects of prescribed medications. Barriers to medication compliance addressed. All patient questions answered. Pt voiced understanding. RTC Return in about 3 months (around 2/22/2022).     Future Appointments   Date Time Provider Awa Kelly   12/2/2021  2:20 PM MD CARI Lerma [facility]   2/22/2022  1:00 PM MD Lyla Gar MD  11/22/2021  11:36 AM

## 2021-11-22 ENCOUNTER — OFFICE VISIT (OUTPATIENT)
Dept: PRIMARY CARE CLINIC | Age: 21
End: 2021-11-22
Payer: COMMERCIAL

## 2021-11-22 VITALS
SYSTOLIC BLOOD PRESSURE: 107 MMHG | TEMPERATURE: 98 F | HEART RATE: 81 BPM | WEIGHT: 101.8 LBS | DIASTOLIC BLOOD PRESSURE: 61 MMHG | BODY MASS INDEX: 17.38 KG/M2 | HEIGHT: 64 IN

## 2021-11-22 DIAGNOSIS — Z12.4 CERVICAL CANCER SCREENING: ICD-10-CM

## 2021-11-22 DIAGNOSIS — F41.1 GAD (GENERALIZED ANXIETY DISORDER): Primary | ICD-10-CM

## 2021-11-22 PROCEDURE — 96127 BRIEF EMOTIONAL/BEHAV ASSMT: CPT | Performed by: FAMILY MEDICINE

## 2021-11-22 PROCEDURE — 99214 OFFICE O/P EST MOD 30 MIN: CPT | Performed by: FAMILY MEDICINE

## 2021-11-22 ASSESSMENT — ENCOUNTER SYMPTOMS
COUGH: 0
EYE PAIN: 0
SHORTNESS OF BREATH: 0
DIARRHEA: 0
ABDOMINAL PAIN: 0
CONSTIPATION: 0

## 2021-11-22 ASSESSMENT — ANXIETY QUESTIONNAIRES
3. WORRYING TOO MUCH ABOUT DIFFERENT THINGS: 3-NEARLY EVERY DAY
GAD7 TOTAL SCORE: 13
5. BEING SO RESTLESS THAT IT IS HARD TO SIT STILL: 1-SEVERAL DAYS
7. FEELING AFRAID AS IF SOMETHING AWFUL MIGHT HAPPEN: 1-SEVERAL DAYS
4. TROUBLE RELAXING: 1-SEVERAL DAYS
1. FEELING NERVOUS, ANXIOUS, OR ON EDGE: 2-OVER HALF THE DAYS
6. BECOMING EASILY ANNOYED OR IRRITABLE: 3-NEARLY EVERY DAY
2. NOT BEING ABLE TO STOP OR CONTROL WORRYING: 2-OVER HALF THE DAYS

## 2021-11-29 ENCOUNTER — PATIENT MESSAGE (OUTPATIENT)
Dept: PRIMARY CARE CLINIC | Age: 21
End: 2021-11-29

## 2021-11-29 DIAGNOSIS — R11.2 NON-INTRACTABLE VOMITING WITH NAUSEA, UNSPECIFIED VOMITING TYPE: ICD-10-CM

## 2021-11-30 RX ORDER — ONDANSETRON 4 MG/1
4 TABLET, ORALLY DISINTEGRATING ORAL EVERY 8 HOURS PRN
Qty: 20 TABLET | Refills: 0 | Status: SHIPPED | OUTPATIENT
Start: 2021-11-30

## 2021-11-30 NOTE — TELEPHONE ENCOUNTER
From: [facility]  To: Dr. Ge Christensen: 11/29/2021 1:58 PM EST  Subject: Alex Spring Dr Kit Melara i hope you had a good thanksgiving! I am out of my zofran prescription, i was hoping you could fill another one for me. It really helps and Im very nauseous lately as i believe i have the cold everyone has right now.  (Not Covid!)

## 2021-11-30 NOTE — TELEPHONE ENCOUNTER
Medication:   Requested Prescriptions     Pending Prescriptions Disp Refills    ondansetron (ZOFRAN ODT) 4 MG disintegrating tablet 20 tablet 0     Sig: Take 1 tablet by mouth every 8 hours as needed for Nausea or Vomiting        Last Filled:      Patient Phone Number: [phone] (home)     Last appt: 11/22/2021   Next appt: 2/22/2022    Last OARRS: No flowsheet data found.

## 2021-12-01 ENCOUNTER — OFFICE VISIT (OUTPATIENT)
Dept: PRIMARY CARE CLINIC | Age: 21
End: 2021-12-01
Payer: COMMERCIAL

## 2021-12-01 ENCOUNTER — OFFICE VISIT (OUTPATIENT)
Dept: OBGYN CLINIC | Age: 21
End: 2021-12-01
Payer: COMMERCIAL

## 2021-12-01 VITALS
HEART RATE: 92 BPM | BODY MASS INDEX: 17.21 KG/M2 | SYSTOLIC BLOOD PRESSURE: 102 MMHG | DIASTOLIC BLOOD PRESSURE: 64 MMHG | WEIGHT: 100.8 LBS | TEMPERATURE: 99.1 F | HEIGHT: 64 IN

## 2021-12-01 VITALS
DIASTOLIC BLOOD PRESSURE: 67 MMHG | SYSTOLIC BLOOD PRESSURE: 110 MMHG | HEIGHT: 64 IN | HEART RATE: 93 BPM | WEIGHT: 100.2 LBS | BODY MASS INDEX: 17.11 KG/M2

## 2021-12-01 DIAGNOSIS — N75.1 BARTHOLIN'S GLAND ABSCESS: Primary | ICD-10-CM

## 2021-12-01 DIAGNOSIS — N76.4 ABSCESS OF RIGHT GENITAL LABIA: Primary | ICD-10-CM

## 2021-12-01 PROCEDURE — 99214 OFFICE O/P EST MOD 30 MIN: CPT | Performed by: FAMILY MEDICINE

## 2021-12-01 PROCEDURE — 56420 I&D BARTHOLINS GLAND ABSCESS: CPT | Performed by: OBSTETRICS & GYNECOLOGY

## 2021-12-01 RX ORDER — SULFAMETHOXAZOLE AND TRIMETHOPRIM 400; 80 MG/1; MG/1
1 TABLET ORAL DAILY
Refills: 0 | Status: CANCELLED | OUTPATIENT
Start: 2021-12-01 | End: 2021-12-11

## 2021-12-01 RX ORDER — SULFAMETHOXAZOLE AND TRIMETHOPRIM 800; 160 MG/1; MG/1
1 TABLET ORAL 2 TIMES DAILY
Qty: 6 TABLET | Refills: 0 | Status: SHIPPED | OUTPATIENT
Start: 2021-12-01 | End: 2021-12-04

## 2021-12-01 NOTE — PROGRESS NOTES
Chief Complaint   Patient presents with    Other     swollen gland in vaginal area       HPI: Kenji Montgomery presents for evaluation and management of 1 week history of progressively worsening swelling in her right labia. States she has had a Bartholin cyst in the past.  She was treated with antibiotics and it improved. This has recurred. She notes no fever chills nausea or vomiting but is quite uncomfortable as the swelling is affecting her ability to sit or walk. Review of Systems    Allergies   Allergen Reactions    Doxycycline      nausea    Macrolides And Ketolides Nausea Only    Troleandomycin Nausea Only    Zithromax [Azithromycin]      New Prescriptions    No medications on file     Current Outpatient Medications   Medication Sig Dispense Refill    ondansetron (ZOFRAN ODT) 4 MG disintegrating tablet Take 1 tablet by mouth every 8 hours as needed for Nausea or Vomiting 20 tablet 0    escitalopram (LEXAPRO) 5 MG tablet Take 1 tablet by mouth daily 90 tablet 1    escitalopram (LEXAPRO) 10 MG tablet Take 1 tablet by mouth daily 90 tablet 2    fluconazole (DIFLUCAN) 150 MG tablet Take two pills PO once weekly x 4 weeks. Take with acidic beverage, exercise 30 min later, rinse off 8 hours later (Patient not taking: Reported on 12/1/2021) 8 tablet 0     No current facility-administered medications for this visit. Past Medical History:   Diagnosis Date    Acne     Face         Objective   /67   Pulse 93   Ht 5' 4\" (1.626 m)   Wt 100 lb 3.2 oz (45.5 kg)   LMP 11/01/2021 (Approximate)   BMI 17.20 kg/m²   Wt Readings from Last 3 Encounters:   12/01/21 100 lb 3.2 oz (45.5 kg)   11/22/21 101 lb 12.8 oz (46.2 kg)   10/25/21 97 lb 12.8 oz (44.4 kg)       Physical Exam  Constitutional:       Appearance: She is well-developed. Cardiovascular:      Rate and Rhythm: Normal rate and regular rhythm. Pulses:           Dorsalis pedis pulses are 2+ on the right side and 2+ on the left side. Posterior tibial pulses are 2+ on the right side and 2+ on the left side. Heart sounds: No murmur heard. No friction rub. No gallop. Comments: No edema lower extremities  Pulmonary:      Effort: Pulmonary effort is normal.      Breath sounds: Normal breath sounds. No wheezing or rales. Abdominal:      General: Bowel sounds are normal. There is no distension. Palpations: Abdomen is soft. There is no mass. Tenderness: There is no abdominal tenderness. Genitourinary:     Comments: Right labia is swollen with a 2-1/2 x 5 cm area of cystic mass with tenderness and erythema. No pointing lesion is observed. Skin:     General: Skin is warm and dry. Findings: No rash. Chemistry        Component Value Date/Time     06/23/2021 1403    K 3.9 06/23/2021 1403     06/23/2021 1403    CO2 24 06/23/2021 1403    BUN 9 06/23/2021 1403    CREATININE 0.5 (L) 06/23/2021 1403        Component Value Date/Time    CALCIUM 9.4 06/23/2021 1403    ALKPHOS 46 06/23/2021 1403    AST 16 06/23/2021 1403    ALT 11 06/23/2021 1403    BILITOT 0.3 06/23/2021 1403          Lab Results   Component Value Date    WBC 5.4 06/23/2021    HGB 13.7 06/23/2021    HCT 40.3 06/23/2021    MCV 96.4 06/23/2021     06/23/2021     No results found for: LABA1C  No results found for: EAG  No results found for: LABA1C  No components found for: CHLPL  No results found for: TRIG  No results found for: HDL  No results found for: LDLCALC  No results found for: LABVLDL      Assessment   Plan   1. Abscess of right genital labia  Appears to be most consistent with an infected Bartholin cyst.  We will attempt to get her in with gynecology in a timely manner for placement of a Word catheter and treatment with antibiotics. Patient already has a referral to Dr. Austin Bui whom she has yet to call. We were able to reach out to them and get her an appointment in that clinic for 11 AM today.   Discussed use, benefit, and side effects of prescribed medications. Barriers to medication compliance addressed. All patient questions answered. Pt voiced understanding. RTC No follow-ups on file.

## 2021-12-01 NOTE — PROGRESS NOTES
INCISION & DRAINAGE OF ABSCESS    DATE: 12/1/21     PHYSICIAN:Kellie Hugo DO     LOCATION: Right Bartholin Gland     EBL: 10mL     ANESTHETIC: Lidocaine     Risks and benefits of the procedure were discussed at length. Written and informed consent obtained. The area was identified and cleansed with Betadinex3. The area was then infiltrated with 20 cc of 1% lidocaine without epinephrine. An incision was then performed utilizing a 11 blade scalpel. The abscess was then drained and irrigated. Loculations were then broken down and further irrigation performed. Word catheter placed. Excellent hemostasis noted. Physical Exam  Genitourinary:                 Patient tolerate the procedure well. Give care instructions. Bactrim x 3 days.    FU in 1 week for recheck     Vasu Puentes DO

## 2021-12-02 ENCOUNTER — VIRTUAL VISIT (OUTPATIENT)
Dept: DERMATOLOGY | Age: 21
End: 2021-12-02
Payer: COMMERCIAL

## 2021-12-02 DIAGNOSIS — L81.8 POST INFLAMMATORY HYPOPIGMENTATION: ICD-10-CM

## 2021-12-02 DIAGNOSIS — B36.0 TINEA VERSICOLOR: ICD-10-CM

## 2021-12-02 DIAGNOSIS — L70.0 ACNE VULGARIS: Primary | ICD-10-CM

## 2021-12-02 PROCEDURE — 99214 OFFICE O/P EST MOD 30 MIN: CPT | Performed by: DERMATOLOGY

## 2021-12-02 RX ORDER — TRETINOIN 0.5 MG/G
CREAM TOPICAL
Qty: 45 G | Refills: 5 | Status: SHIPPED | OUTPATIENT
Start: 2021-12-02 | End: 2022-01-01

## 2021-12-02 RX ORDER — CLINDAMYCIN AND BENZOYL PEROXIDE 10; 50 MG/G; MG/G
GEL TOPICAL
Qty: 50 G | Refills: 5 | Status: SHIPPED | OUTPATIENT
Start: 2021-12-02

## 2021-12-02 RX ORDER — SPIRONOLACTONE 50 MG/1
TABLET, FILM COATED ORAL
Qty: 60 TABLET | Refills: 3 | Status: SHIPPED | OUTPATIENT
Start: 2021-12-02 | End: 2022-05-12 | Stop reason: SDUPTHER

## 2021-12-02 NOTE — PROGRESS NOTES
TELEHEALTH EVALUATION -- Audio/Visual (During CELEJ-12 public health emergency)    I have explained to Ms. Sandra Moya  that a Physical Examination is inherently limited by the nature of telemedicine and that this may lead to delayed or inadequate diagnosis. I also explained that she may require a higher level of care if a diagnosis can not be reasonably established with a virtual visit. Ms. Sandra Moya has provided her Consent to proceed with a Virtual Visit today. Ms. Sandra Moya  was personally present on the video link with me today. This visit was completed virtually using the [employer] platform. Due to this being a TeleHealth encounter, evaluation of the following organ systems is limited: Vitals/Constitutional/EENT/Resp/CV/GI//MS/Neuro/Skin/Heme-Lymph-Imm. Patient's Name: Sandra Moya  MRN: <[id number]>  YOB: 2000  Date of Visit: 12/2/2021  Referring Provider: No ref. provider found  Primary Care provider: Gus Nuñez MD    Subjective:     Chief Complaint   Patient presents with    Acne       History of Present Illness:  Sandra Moya is a 19 y.o. female who presents today for evaluation and management of acne. Patient complains of  acne present since 4years of age  Her acne has been located mostly affecting the following locations: cheeks, jawline, chin, back and shoulders   Course: Since that time, the acne has  gradually worsening   She has not used topical  prescription acne therapies in the past 6 months. She has not used systemic antibiotics for acne in the past 6 months. Previous treatments:  Minocycline and Doxycycline in 2010, Accutane in 2011  Prior non prescription acne treatments have included Differin   Accutane was effective and she reports being clear for 6 months after completion of treatment course, however flared after that. Patient is currently treating with OTC facial creams and Differin.   Modifying factors include Menses. Today is a normal day for her acne    Overall, complexion is described as combined. Patient states that her acne gets worse with menses. She endorses regular menstrual periods   She currently uses none for contraception. She is not currently pregnant  She has no plans for pregnancy or breast feeding at this time. She denies hair growth on her face/chest/abdomen. She denies a personal history of breast/ovarian/uterine cancer. She denies a family history of breast/ovarian/uterine cancer. Family history of acne on maternal and paternal sides      Past medical and surgical histories, current medications, allergies, social and family histories were reviewed with no change since the last visit     At her last visit, patient was prescribed Diflucan to treat TV. She reports marked improvement. Continues to have some areas with lighter colored skin, but very minimal.  No new rashes. Allergies: Allergies   Allergen Reactions    Doxycycline      nausea    Macrolides And Ketolides Nausea Only    Troleandomycin Nausea Only    Zithromax [Azithromycin]        Current Medications:  Current Outpatient Medications   Medication Sig Dispense Refill    sulfamethoxazole-trimethoprim (BACTRIM DS) 800-160 MG per tablet Take 1 tablet by mouth 2 times daily for 3 days 6 tablet 0    ondansetron (ZOFRAN ODT) 4 MG disintegrating tablet Take 1 tablet by mouth every 8 hours as needed for Nausea or Vomiting 20 tablet 0    escitalopram (LEXAPRO) 5 MG tablet Take 1 tablet by mouth daily 90 tablet 1    escitalopram (LEXAPRO) 10 MG tablet Take 1 tablet by mouth daily 90 tablet 2     No current facility-administered medications for this visit. Review of Systems:  Constitutional: No fevers, chills or recent illness. Skin: No new, bleeding or changing skin lesions. Objective: There were no vitals filed for this visit.   Physical Examination:  General: alert, comfortable, no apparent spironolactone 50 mg daily x 1 week. If well tolerated, increase dose to 100 mg PO daily. Potential side effects including hypotension, teratogenicity, hyperkalemia, gynecomastia, and menstrual irregularities discussing. 2. Tinea versicolor with post inflammatory hypopigmentation  Location/objective findings:  Few scattered hypopigmented macules on torso  Discussed the potential for recurrence. No treatment needed at this time. Patient was educated regarding the importance of use of bland emollient lotions to be used under topical acne medications. Total time (pre, during, after visit on date of service): 35 minutes was spent with the patient discussing dx, findings/test results, reviewing chart, tx options, side effects, patient progress, follow up, coordination of care, and documentation. Follow up:  Return visit in 3 months or as needed for change in condition. All questions addressed. Procedure:   No procedure performed              Pursuant to the emergency declaration under the [facility] waiver authority and the [employer] Act, this Virtual  Visit was conducted, with patient's consent, to reduce the patient's risk of exposure to COVID-19 and provide continuity of care for an established patient. Services were provided through a video synchronous discussion virtually to substitute for in-person clinic visit.     Lizz Manzanares MD, MS

## 2021-12-02 NOTE — PATIENT INSTRUCTIONS
Bree Taylor has prescribed a vitamin A cream, and can help treat acne, wrinkles, and brown spots on the face and other places. The main problems people have with this is that it can cause irritation, redness, and flaking if too much cream is used, or if it is used too often. EVERYONE's skin will adjust to it eventually, but everyone's skin is different. Some will adjust in 4-5 weeks, some will take 4-5 months, and a few may take up to a year. Dr. Chawla Rear Tips:  1) Use just a bit. Never use more than a small pea-sized amount to the entire face (or the entire upper chest, or the entire upper back if you are also treating acne in those areas). You should put a pea-sized amount on your finger, then dab it in five areas (forehead, right cheek, left cheek, chin, and nose) then spread the MOLECULES around. A little goes a long way, if you are using it correctly, one 45 gram tube should last at least 3 month if you are using it on the face, back and chest, and much longer if you are just using it on the face. 2) Start slow. Take it slow when you begin, use it every third night for the first two weeks (Monday and Thursday night the first week, then Sunday, Wednesday, Saturday the 2nd week), then every 2nd night for two weeks (Mond, Wed, Fir, Sun, [address], Thurs, Sat), then you can try it every night as long as you aren't getting irritated. However, if you EVER develop extensive flaking or irritation, STOP for 2 days, then go back to your previous frequency. This will allow your skin to adjust at it's specific rate. Remember to baby your skin. 3) Use a moisturizer if needed. Put on a moisturizer over the vitamin A cream or before if still peeling or before and over (sandwich). Any moisturizer will likely do, cetaphil, cerave, eucerin, are some brands you could try that should work great.  My favorite is Cerave Moisturizing Cream, which you can get at [facility], [facility], or really in most drug stores or on [employer], or [employer] 12 hour miracle ointment, which you can purchase online for about $22. Just make sure whatever you use says \"non-comedogenic\" or \"won't clog pores\" on it. 4) Use sunscreen in the morning. Make sure it is Broad Spectrum with SPF 45-50 or higher. Lots of sunscreens can make breakouts flare. Neutrogena Ultrasheer Dry Touch and Elta MD are two other very good brands you could try that shouldn't make you break out.

## 2022-04-22 DIAGNOSIS — F41.1 GAD (GENERALIZED ANXIETY DISORDER): ICD-10-CM

## 2022-04-22 RX ORDER — ESCITALOPRAM OXALATE 5 MG/1
5 TABLET ORAL DAILY
Qty: 90 TABLET | Refills: 3 | Status: SHIPPED | OUTPATIENT
Start: 2022-04-22

## 2022-04-22 NOTE — TELEPHONE ENCOUNTER
Medication:   Requested Prescriptions     Pending Prescriptions Disp Refills    escitalopram (LEXAPRO) 5 MG tablet 90 tablet 1     Sig: Take 1 tablet by mouth daily        Last Filled:      Patient Phone Number: [phone] (home)     Last appt: 12/1/2021   Next appt: Visit date not found    Last OARRS: No flowsheet data found.

## 2022-05-09 ENCOUNTER — PATIENT MESSAGE (OUTPATIENT)
Dept: PRIMARY CARE CLINIC | Age: 22
End: 2022-05-09

## 2022-05-10 RX ORDER — FLUCONAZOLE 150 MG/1
TABLET ORAL
Qty: 8 TABLET | Refills: 0 | Status: SHIPPED | OUTPATIENT
Start: 2022-05-10 | End: 2022-05-12 | Stop reason: SDUPTHER

## 2022-05-10 NOTE — TELEPHONE ENCOUNTER
From: [facility]  To: Dr. Chucky Gary: 5/9/2022 9:09 PM EDT  Subject: Wash Asa Dr Garrido Semen! Im having a really really bad flare up of tinea versicolor, all over my chest back & lower area :( i cant message Dr. Parada Border on here so i was hoping you are able to refill me a fluocanzole?    I know i have to schedule an appointment, Ill call to do so tomorrow    Thanks so much,   [employer]

## 2022-05-12 ENCOUNTER — TELEMEDICINE (OUTPATIENT)
Dept: PRIMARY CARE CLINIC | Age: 22
End: 2022-05-12
Payer: COMMERCIAL

## 2022-05-12 DIAGNOSIS — L70.0 ACNE VULGARIS: ICD-10-CM

## 2022-05-12 DIAGNOSIS — F41.1 GAD (GENERALIZED ANXIETY DISORDER): ICD-10-CM

## 2022-05-12 DIAGNOSIS — R68.82 DECREASED LIBIDO: ICD-10-CM

## 2022-05-12 DIAGNOSIS — B36.0 TINEA VERSICOLOR: Primary | ICD-10-CM

## 2022-05-12 PROCEDURE — 99214 OFFICE O/P EST MOD 30 MIN: CPT | Performed by: FAMILY MEDICINE

## 2022-05-12 RX ORDER — BUPROPION HYDROCHLORIDE 150 MG/1
150 TABLET ORAL EVERY MORNING
Qty: 30 TABLET | Refills: 3 | Status: SHIPPED | OUTPATIENT
Start: 2022-05-12

## 2022-05-12 RX ORDER — SPIRONOLACTONE 50 MG/1
TABLET, FILM COATED ORAL
Qty: 60 TABLET | Refills: 3 | Status: SHIPPED | OUTPATIENT
Start: 2022-05-12

## 2022-05-12 RX ORDER — FLUCONAZOLE 150 MG/1
TABLET ORAL
Qty: 8 TABLET | Refills: 0 | Status: SHIPPED | OUTPATIENT
Start: 2022-05-12

## 2022-05-12 ASSESSMENT — ENCOUNTER SYMPTOMS
EYE PAIN: 0
SHORTNESS OF BREATH: 0
ABDOMINAL PAIN: 0
CONSTIPATION: 0
DIARRHEA: 0
COUGH: 0

## 2022-06-18 DIAGNOSIS — L70.0 ACNE VULGARIS: ICD-10-CM

## 2022-06-20 RX ORDER — SPIRONOLACTONE 50 MG/1
TABLET, FILM COATED ORAL
Qty: 60 TABLET | Refills: 3 | OUTPATIENT
Start: 2022-06-20

## 2022-07-26 ENCOUNTER — PATIENT MESSAGE (OUTPATIENT)
Dept: OBGYN CLINIC | Age: 22
End: 2022-07-26

## 2022-07-27 NOTE — TELEPHONE ENCOUNTER
Oh no - im overbooked the rest of the week. Please offer an same day visit with any provider available , but if this is not soon enough, she may need to be seen in ED or Urgent Care!     Carly

## 2022-07-29 NOTE — TELEPHONE ENCOUNTER
Patient called to see if there were any open appointments today. Offered 1pm with Dr. Pelon Harris. Patient unable to make the appointment, informed patient per Dr. Sukhwinder Tobin note report to the ED or Urgent care. Patient voiced understanding.

## 2022-12-05 DIAGNOSIS — R10.84 GENERALIZED ABDOMINAL PAIN: ICD-10-CM

## 2022-12-05 DIAGNOSIS — R19.7 DIARRHEA, UNSPECIFIED TYPE: Primary | ICD-10-CM

## 2023-01-03 ENCOUNTER — TELEPHONE (OUTPATIENT)
Dept: OBGYN CLINIC | Age: 23
End: 2023-01-03

## 2023-01-03 NOTE — TELEPHONE ENCOUNTER
Called pt to see if we can get her scheduled and Pt stated she has an appt at [facility] today 1/3/23 at 1pm. Pt will call office if follow up is needed.

## 2023-01-04 DIAGNOSIS — R11.2 NON-INTRACTABLE VOMITING WITH NAUSEA: ICD-10-CM

## 2023-01-04 RX ORDER — ONDANSETRON 4 MG/1
4 TABLET, ORALLY DISINTEGRATING ORAL EVERY 8 HOURS PRN
Qty: 20 TABLET | Refills: 0 | Status: SHIPPED | OUTPATIENT
Start: 2023-01-04

## 2023-01-04 NOTE — TELEPHONE ENCOUNTER
Medication:   Requested Prescriptions     Pending Prescriptions Disp Refills    ondansetron (ZOFRAN ODT) 4 MG disintegrating tablet 20 tablet 0     Sig: Take 1 tablet by mouth every 8 hours as needed for Nausea or Vomiting        Last Filled:  11/30/2021     Patient Phone Number: [phone] (home)     Last appt: 5/12/2022   Next appt: Visit date not found    Last OARRS: No flowsheet data found. No follow-ups on file.

## 2023-01-05 RX ORDER — ONDANSETRON 4 MG/1
TABLET, ORALLY DISINTEGRATING ORAL
Qty: 20 TABLET | Refills: 0 | OUTPATIENT
Start: 2023-01-05

## 2023-01-05 NOTE — TELEPHONE ENCOUNTER
Medication:   Requested Prescriptions     Pending Prescriptions Disp Refills    ondansetron (ZOFRAN-ODT) 4 MG disintegrating tablet [Pharmacy Med Name: ONDANSETRON ODT 4MG TABLETS] 20 tablet 0     Sig: DISSOLVE 1 TABLET ON THE TONGUE EVERY 8 HOURS AS NEEDED FOR NAUSEA OR VOMITING        Last Filled:      Patient Phone Number: [phone] (home)     Last appt: 5/12/2022   Next appt: 1/4/2023    Last OARRS: No flowsheet data found. No follow-ups on file.

## 2023-06-05 ENCOUNTER — TELEPHONE (OUTPATIENT)
Dept: PRIMARY CARE CLINIC | Age: 23
End: 2023-06-05

## 2023-06-05 NOTE — TELEPHONE ENCOUNTER
Called and left message for patient that yes we do the gene testing and she can call to make appointment to have this done

## 2023-06-05 NOTE — TELEPHONE ENCOUNTER
----- Message from Jacob Quevedo sent at 6/2/2023  1:10 PM EDT -----  Subject: Message to Provider    QUESTIONS  Information for Provider? Patient called to see if the office does the   mouth swap for gene testing to see which medication are most effective for   her body. Patient had a friend recently do this at her PCP and she wants   to this too to get a better antidepressant that works for her. Please call   patient back to discuss and possible schedule.  ---------------------------------------------------------------------------  --------------  Caro LOVING  [phone]; OK to leave message on voicemail  ---------------------------------------------------------------------------  --------------  SCRIPT ANSWERS  Relationship to Patient?  Self